# Patient Record
Sex: FEMALE | Race: WHITE | NOT HISPANIC OR LATINO | Employment: FULL TIME | URBAN - METROPOLITAN AREA
[De-identification: names, ages, dates, MRNs, and addresses within clinical notes are randomized per-mention and may not be internally consistent; named-entity substitution may affect disease eponyms.]

---

## 2017-07-14 ENCOUNTER — OFFICE VISIT (OUTPATIENT)
Dept: FAMILY MEDICINE CLINIC | Facility: CLINIC | Age: 32
End: 2017-07-14

## 2017-07-14 VITALS
RESPIRATION RATE: 16 BRPM | OXYGEN SATURATION: 99 % | SYSTOLIC BLOOD PRESSURE: 132 MMHG | WEIGHT: 156 LBS | TEMPERATURE: 99.7 F | HEIGHT: 69 IN | HEART RATE: 86 BPM | DIASTOLIC BLOOD PRESSURE: 80 MMHG | BODY MASS INDEX: 23.11 KG/M2

## 2017-07-14 DIAGNOSIS — F32.A DEPRESSION, UNSPECIFIED DEPRESSION TYPE: Primary | ICD-10-CM

## 2017-07-14 DIAGNOSIS — F41.9 ANXIETY: ICD-10-CM

## 2017-07-14 PROCEDURE — 99214 OFFICE O/P EST MOD 30 MIN: CPT | Performed by: NURSE PRACTITIONER

## 2017-07-14 RX ORDER — BUSPIRONE HYDROCHLORIDE 5 MG/1
5 TABLET ORAL 3 TIMES DAILY
Qty: 90 TABLET | Refills: 0 | Status: SHIPPED | OUTPATIENT
Start: 2017-07-14

## 2017-07-14 RX ORDER — ESCITALOPRAM OXALATE 20 MG/1
20 TABLET ORAL DAILY
Qty: 30 TABLET | Refills: 0 | Status: SHIPPED | OUTPATIENT
Start: 2017-07-14 | End: 2017-09-08

## 2017-07-14 NOTE — PROGRESS NOTES
Subjective   Samira Hodge is a 31 y.o. female.     History of Present Illness   Samira Hodge female 31 y.o. who presents today for new evaluation and treatment of Depression and Anxiety.  She reports depressed mood, crying spells, difficulty falling asleep, fatigue, anxiety, impaired concentration, depressed mood and anxiety, excessive worry, sleep disturbance, decressed appetite and weight loss. Onset of symptoms was approximately a few months ago.  She denies current suicidal and homicidal ideation. Risk factors are marriage difficulties and job stress.  Previous treatment includes none.  She complains of the following medication side effects: none.  The patient is currently in counseling.    The following portions of the patient's history were reviewed and updated as appropriate: allergies, current medications, past family history, past medical history, past social history, past surgical history and problem list.    Review of Systems   Constitutional: Negative for unexpected weight change.   Respiratory: Negative for shortness of breath.    Cardiovascular: Negative for chest pain and palpitations.   Psychiatric/Behavioral: Positive for decreased concentration, dysphoric mood and sleep disturbance. Negative for behavioral problems, self-injury and suicidal ideas.       Objective   Physical Exam   Constitutional: She is oriented to person, place, and time. She appears well-developed and well-nourished.   Pulmonary/Chest: Effort normal.   Neurological: She is alert and oriented to person, place, and time.   Psychiatric: Her speech is normal. Judgment and thought content normal. Cognition and memory are normal. She exhibits a depressed mood.   Nursing note and vitals reviewed.      Assessment/Plan   Samira was seen today for depression.    Diagnoses and all orders for this visit:    Depression, unspecified depression type  -     escitalopram (LEXAPRO) 20 MG tablet; Take 1 tablet by mouth Daily.  -      busPIRone (BUSPAR) 5 MG tablet; Take 1 tablet by mouth 3 (Three) Times a Day.    Anxiety  -     escitalopram (LEXAPRO) 20 MG tablet; Take 1 tablet by mouth Daily.  -     busPIRone (BUSPAR) 5 MG tablet; Take 1 tablet by mouth 3 (Three) Times a Day.        Patient denies current suicidal ideation. She states she would never do that as she has her 4 year old son.  Patient made a pact to contact our office, therapist, help line or go to ED if suicidal thoughts occur.

## 2017-08-11 ENCOUNTER — OFFICE VISIT (OUTPATIENT)
Dept: FAMILY MEDICINE CLINIC | Facility: CLINIC | Age: 32
End: 2017-08-11

## 2017-08-11 VITALS
HEART RATE: 72 BPM | HEIGHT: 69 IN | TEMPERATURE: 98.4 F | BODY MASS INDEX: 22.81 KG/M2 | RESPIRATION RATE: 16 BRPM | SYSTOLIC BLOOD PRESSURE: 112 MMHG | OXYGEN SATURATION: 99 % | DIASTOLIC BLOOD PRESSURE: 72 MMHG | WEIGHT: 154 LBS

## 2017-08-11 DIAGNOSIS — F32.A DEPRESSION, UNSPECIFIED DEPRESSION TYPE: Primary | ICD-10-CM

## 2017-08-11 PROCEDURE — 99213 OFFICE O/P EST LOW 20 MIN: CPT | Performed by: NURSE PRACTITIONER

## 2017-08-11 RX ORDER — DESVENLAFAXINE SUCCINATE 50 MG/1
50 TABLET, EXTENDED RELEASE ORAL DAILY
Qty: 30 TABLET | Refills: 0 | Status: SHIPPED | OUTPATIENT
Start: 2017-08-11 | End: 2017-09-08 | Stop reason: SDUPTHER

## 2017-08-11 NOTE — PROGRESS NOTES
Subjective   Samira Hodge is a 31 y.o. female.     History of Present Illness   F/u on depression and anxiety.  Does not feel like lexapro has helped. Patient has been taking for about 4 weeks now. She denies side effects.  She would like to try different medication.  She continues to follow up with her therapist regularly. She has been off work for a few weeks and states she does not feel like she can handle going back to work just yet.    The following portions of the patient's history were reviewed and updated as appropriate: allergies, current medications, past family history, past medical history, past social history, past surgical history and problem list.    Review of Systems   Constitutional: Negative for unexpected weight change.   Respiratory: Negative for shortness of breath.    Cardiovascular: Negative for chest pain and palpitations.   Psychiatric/Behavioral: Positive for decreased concentration, dysphoric mood and sleep disturbance. Negative for behavioral problems, self-injury and suicidal ideas.       Objective   Physical Exam   Constitutional: She is oriented to person, place, and time. She appears well-developed and well-nourished.   Pulmonary/Chest: Effort normal.   Neurological: She is alert and oriented to person, place, and time.   Psychiatric: She has a normal mood and affect. Her behavior is normal. Judgment and thought content normal.   Nursing note and vitals reviewed.      Assessment/Plan   Samira was seen today for depression and fatigue.    Diagnoses and all orders for this visit:    Depression, unspecified depression type  -     desvenlafaxine (PRISTIQ) 50 MG 24 hr tablet; Take 1 tablet by mouth Daily.

## 2017-09-08 ENCOUNTER — OFFICE VISIT (OUTPATIENT)
Dept: FAMILY MEDICINE CLINIC | Facility: CLINIC | Age: 32
End: 2017-09-08

## 2017-09-08 VITALS
RESPIRATION RATE: 16 BRPM | HEART RATE: 80 BPM | DIASTOLIC BLOOD PRESSURE: 72 MMHG | HEIGHT: 69 IN | BODY MASS INDEX: 23.7 KG/M2 | SYSTOLIC BLOOD PRESSURE: 110 MMHG | TEMPERATURE: 98.8 F | WEIGHT: 160 LBS

## 2017-09-08 DIAGNOSIS — F41.9 ANXIETY: ICD-10-CM

## 2017-09-08 DIAGNOSIS — F32.A DEPRESSION, UNSPECIFIED DEPRESSION TYPE: ICD-10-CM

## 2017-09-08 PROCEDURE — 99213 OFFICE O/P EST LOW 20 MIN: CPT | Performed by: NURSE PRACTITIONER

## 2017-09-08 RX ORDER — DESVENLAFAXINE 100 MG/1
100 TABLET, EXTENDED RELEASE ORAL DAILY
Qty: 30 TABLET | Refills: 5 | Status: SHIPPED | OUTPATIENT
Start: 2017-09-08 | End: 2018-03-26

## 2017-09-08 NOTE — PROGRESS NOTES
Subjective   Samira Hodge is a 32 y.o. female.     History of Present Illness   Samira Hodge female 32 y.o. who presents today for follow up of Depression and anxiety.  She reports medication has helped and needs to increase dose.  She denies current suicidal and homicidal ideation. Risk factors are marriage difficulties and job stress.  She complains of the following medication side effects: sexual dysfunction.  The patient is currently in counseling. Reports she quit her job which helped her stress. She is in marriage counseling with her  and reports improvement in relationship.     The following portions of the patient's history were reviewed and updated as appropriate: allergies, current medications, past family history, past medical history, past social history, past surgical history and problem list.    Review of Systems   Constitutional: Negative for unexpected weight change.   Respiratory: Negative for shortness of breath.    Cardiovascular: Negative for chest pain and palpitations.   Psychiatric/Behavioral: Negative for behavioral problems, dysphoric mood, self-injury and suicidal ideas. The patient is nervous/anxious.        Objective   Physical Exam   Constitutional: She is oriented to person, place, and time. She appears well-developed and well-nourished.   Pulmonary/Chest: Effort normal.   Neurological: She is alert and oriented to person, place, and time.   Psychiatric: She has a normal mood and affect. Her behavior is normal. Judgment and thought content normal.   Nursing note and vitals reviewed.      Assessment/Plan   Samira was seen today for depression.    Diagnoses and all orders for this visit:    Depression, unspecified depression type  -     desvenlafaxine (PRISTIQ) 100 MG 24 hr tablet; Take 1 tablet by mouth Daily.    Anxiety

## 2018-03-26 ENCOUNTER — OFFICE VISIT (OUTPATIENT)
Dept: FAMILY MEDICINE CLINIC | Facility: CLINIC | Age: 33
End: 2018-03-26

## 2018-03-26 VITALS
HEIGHT: 69 IN | OXYGEN SATURATION: 99 % | WEIGHT: 206 LBS | BODY MASS INDEX: 30.51 KG/M2 | TEMPERATURE: 98.4 F | DIASTOLIC BLOOD PRESSURE: 86 MMHG | RESPIRATION RATE: 18 BRPM | HEART RATE: 96 BPM | SYSTOLIC BLOOD PRESSURE: 121 MMHG

## 2018-03-26 DIAGNOSIS — F41.9 ANXIETY: Primary | ICD-10-CM

## 2018-03-26 PROCEDURE — 99213 OFFICE O/P EST LOW 20 MIN: CPT | Performed by: NURSE PRACTITIONER

## 2018-03-26 RX ORDER — DESVENLAFAXINE 25 MG/1
50 TABLET, EXTENDED RELEASE ORAL DAILY
Qty: 60 TABLET | Refills: 0 | Status: SHIPPED | OUTPATIENT
Start: 2018-03-26

## 2018-03-26 NOTE — PROGRESS NOTES
Subjective   Samira Hodge is a 32 y.o. female.     History of Present Illness   Samira Hodge female 32 y.o. who presents today for follow up of Anxiety.  She reports anxiety and weight gain. Onset of symptoms was approximately several months ago.  She denies current suicidal and homicidal ideation. Risk factors are marriage difficulties and job stress which has improved when she quit her job.   Previous treatment includes current Rx.  She complains of the following medication side effects: weight gain.   She would like to taper off pristiq as she does not feel she needs it anymore.     The following portions of the patient's history were reviewed and updated as appropriate: allergies, current medications, past family history, past medical history, past social history, past surgical history and problem list.    Review of Systems   Constitutional: Negative for unexpected weight change.   Respiratory: Negative for shortness of breath.    Cardiovascular: Negative for chest pain and palpitations.   Psychiatric/Behavioral: Negative for behavioral problems, self-injury, sleep disturbance and suicidal ideas. The patient is not nervous/anxious.        Objective   Physical Exam   Constitutional: She is oriented to person, place, and time. She appears well-developed and well-nourished.   Cardiovascular: Normal rate and regular rhythm.    Pulmonary/Chest: Effort normal and breath sounds normal.   Neurological: She is alert and oriented to person, place, and time.   Psychiatric: She has a normal mood and affect. Her behavior is normal. Judgment and thought content normal.   Nursing note and vitals reviewed.      Assessment/Plan   Samira was seen today for depression and weight gain.    Diagnoses and all orders for this visit:    Anxiety  -     Desvenlafaxine Succinate ER (PRISTIQ) 25 MG tablet sustained-release 24 hour; Take 2 tablets by mouth Daily. For 2 weeks, then decrease to 25 mg daily for 2 weeks then  stop

## 2023-07-30 ENCOUNTER — E-VISIT (OUTPATIENT)
Dept: FAMILY MEDICINE CLINIC | Facility: TELEHEALTH | Age: 38
End: 2023-07-30
Payer: COMMERCIAL

## 2023-07-30 PROCEDURE — BRIGHTMDVISIT: Performed by: NURSE PRACTITIONER

## 2023-07-30 NOTE — EXTERNAL PATIENT INSTRUCTIONS
Note   If symptoms do not improve in 3-5 days follow up at urgent care for evaluation   Diagnosis   Bacterial vaginosis   My name is MARILOU Perez, and I'm a healthcare provider at [facility]. Based on your interview, I see you have bacterial vaginosis (BV). BV is a very common vaginal infection. BV isn't considered a sexually transmitted infection (STI).   Medications   Your pharmacy   [id number] [facility] [place] [id number] [phone]     Prescription   Metronidazole (500mg): Take 1 tablet by mouth twice a day for 7 days for infection. Do not drink alcohol while taking this medication and for 24 hours after you finish the course of this medication. This medication is an antibiotic. Take it exactly as directed. You must finish the entire course of medication, even if you feel better after the first several days.   Fluconazole (150mg): Take 1 tablet by mouth once for 1 day as a single dose. Use this medication only if you develop a yeast infection. If symptoms are still present after 3 days, you may take a second tablet.    Metronidazole is the treatment recommended by the Centers for Disease Control and Prevention (CDC) for BV.    Because you've developed yeast infections after taking antibiotics in the past, I've prescribed Diflucan (fluconazole). Diflucan is an oral antifungal that treats yeast infections. If you develop yeast infection symptoms (such as itching or a white discharge) while taking the antibiotic or after finishing the antibiotic, then take the Diflucan as directed.   About your diagnosis   Bacterial vaginosis (BV) is a vaginal infection that happens when there are changes in the number and type of bacteria that normally live in the vagina. Overgrowth of certain bacteria can lead to symptoms commonly associated with BV. We're not sure what causes this overgrowth of bacteria, but we do have effective treatments to cure the infection.  Although BV isn't considered a sexually transmitted infection (STI), sexual activity is a risk factor for getting BV.   What to expect   If you follow this treatment plan, you should feel better in about 1 week. It's important that you finish all your medications, even if you feel better after the first several days.   When to seek care   Call us at [phone]   with any sudden or unexpected symptoms.    Symptoms that change or get worse.    Symptoms that don't go away, even after completing your treatment plan.    A fever of 101F or higher.    Frothy or foamy vaginal discharge.    Green or yellow vaginal discharge.    Spotting or bleeding unrelated to your period.    Severe abdominal cramping or pain.    Sores on your genital area.    Nausea or vomiting.   Other treatment   Avoid sex or use condoms consistently and correctly while you're being treated for BV.   BV can increase your risk of getting sexually transmitted infections (STIs), such as HIV, gonorrhea, chlamydia, and herpes. Talk to your healthcare provider about screening for these and other STIs.   Prevention    Use unscented soaps.    Make sure you rinse off all soap or shower gel when bathing or showering.    If you take baths, don't add soap or any other bath products to the bath water.    After bathing, dry off completely before you get dressed.    Don't use scented sanitary pads or tampons.    Avoid using scented condoms or dental dams.    Urinate after sex.    Drink plenty of water.    Avoid douching products. Douching can increase the risk of vaginal infections.    Avoid products that contain nonoxynol-9.   Your provider   Your diagnosis was provided by MARILOU Perez, a member of your trusted care team at [facility].   If you have any questions, call us at [phone]  .

## 2023-07-30 NOTE — E-VISIT TREATED
Chief Complaint: Yeast infection   Patient introduction   Patient is 37-year-old female presenting with 1 to 3 days of fishy-smelling vaginal discharge.   General presentation   No vaginal dryness, vaginal bleeding or spotting unrelated to menstruation, genital erythema, genital edema, darkening of genital skin, or vaginal pain.   Symptom onset was after menses.   Sexually active in the past 90 days. No new sex partner in previous 2 weeks. No treatment for an STI within the past 3 months.   No hormonal medication. Has not recently used douching products or products containing nonoxynol-9.   No new or recent use of possible irritants. Has not taken antibiotics in the last 2 weeks.   Positive history of vulvovaginal candidiasis, with 1 to 3 episodes in the previous 12 months. Current symptoms are different from previous episodes of vulvovaginal candidiasis.   Has not tried any treatments for current symptoms.   History of bacterial vaginosis, with 1 to 3 episodes in the previous 12 months. Current symptoms are similar to previous episodes of bacterial vaginosis.   The following treatments were helpful for bacterial vaginosis symptoms in the past:    Oral metronidazole   Review of red flags/alarm symptoms:    No genital trauma    No genital sores    No abdominal or pelvic surgery within the last month    No fever    No vomiting    No abdominal pain    No retained foreign object in vagina    No treatment for an STI in the last 3 months    No sexual partner tested positive for an STI   Pregnancy/menstrual status/breastfeeding: Not pregnant. Not breastfeeding. Regarding date of last menstrual period, patient writes: Last day of last cycle was July 25th.   Preferred medication route:   Prefers oral treatment option.   Previous antibiotic-induced yeast infection, so Diflucan (fluconazole) is recommended.   Current medications   Not taking other medications or supplements.   Medication allergies   None.   Medication  contraindication review   None.   Past medical history   No diabetes mellitus.   Immune conditions: No immunocompromising conditions.   No history of cancer.   Social history   Never smoked tobacco.   Patient-submitted comments   Patient was asked if they had anything to add about their symptoms. Patient writes: In the past when being treated for BV via oral antibiotics it has thrown me into a yeast infection after. Can we prescribed me something to follow the BV treatment today as well for the yeast infection I know will follow?.   Patient did not request an excuse note.   Assessment   Bacterial vaginosis.   This is the likely diagnosis based on patient's interview responses, including:    Symptom profile    Sexual activity in the past 90 days    Prior diagnosis of bacterial vaginosis with similar symptoms    Symptom onset after menses   Plan   Medications:    metronidazole 500 mg tablet RX 500mg 1 tab PO bid 7d for infection. Do not drink alcohol while taking this medication and for 24 hours after you finish the course of this medication. This medication is an antibiotic. Take it exactly as directed. You must finish the entire course of medication, even if you feel better after the first several days. Amount is 14 tab.    fluconazole 150 mg tablet RX 150mg 1 tab PO once 1d as a single dose for vaginal yeast infection. Repeat in 72 hours if symptoms persist. Amount is 2 tab.   The patient's prescriptions will be sent to:   [id number]   [id number] [place] [id number]   Phone: [phone]     Fax: [phone]   Education:    Condition and causes    Prevention    Treatment and self-care    When to call provider   Follow-up:   Patient to follow up as needed for progression or lack of improvement in symptoms within 7 to 10 days.   ----------   Electronically signed by MARILOU Perez on 2023-07-30 at 12:09PM   ----------   Patient Interview Transcript:   Which of these  symptoms are bothering you? Select all that apply.    Fishy-smelling vaginal discharge   Not selected:    Itching around my vagina    Itching in my vagina    Burning around my vagina    Burning in my vagina    Vaginal discharge that looks different than usual    Pain during sex    Burning with urination    None of the above   How long have you had these symptoms? Select one.    1 to 3 days   Not selected:    Less than 24 hours    4 to 7 days    More than 7 days   Do you have any of these vaginal symptoms? Select all that apply.    None of the above   Not selected:    Pain    Dryness    Redness    Swelling    Darkening of the skin of the vulva    Unexpected bleeding or spotting between periods or after menopause   Since your symptoms started, have you used a vaginal health screening kit to check the acidity (pH) of your vaginal discharge? These kits are available without a prescription at many drug stores and pharmacies. Common brands include Monistat Complete Care Vaginal Health Test and Vagisil Screening Kit. Select one.    No   Not selected:    Yes   Have you noticed any sores on your genital area? This includes blisters or ulcers. Select one.    No   Not selected:    Yes   Along with your vaginal symptoms, have you had any of these symptoms? Select all that apply.    None of the above   Not selected:    Fever    Vomiting    Abdominal (stomach) pain   Before your symptoms began, did you have an injury to your genital area or vagina? Select one.    No   Not selected:    Yes   Could an object like a tampon or condom be stuck inside your vagina? Select one.    No   Not selected:    Yes   In the 2 weeks before your symptoms began, did you use either of these products? Select all that apply.    None of the above   Not selected:    Douching products    Products containing nonoxynol-9, a spermicide found in condoms, sponges, vaginal films, and jellies   In the week before your symptoms started, did any of these come into  contact with your genital area? Select all that apply.    None of the above   Not selected:    New soap    Underwear washed with a new laundry detergent    New sex toy    New cream or lotion    New medication    New perfume    New feminine or flushable wipe    Other new product (specify)   Have you had a yeast infection before? Select one.    Yes, but my current symptoms feel different than before   Not selected:    Yes, and my current symptoms feel the same as before    No   How many yeast infections have you had in the last 12 months? Select one.    1 to 3   Not selected:    0    4 or more   Have you ever been treated for bacterial vaginosis (BV)? Select one.    Yes   Not selected:    No   Compared to the last time you were treated for BV, how do your current symptoms feel? Select one.    The same   Not selected:    Different   In the last 12 months, how many times have you been treated for BV? Select one.    1 to 3   Not selected:    0    4 or more   In the past, have you developed yeast infections as a result of taking oral antibiotics? Select one.    Yes   Not selected:    No   Were you sexually active at any time in the last 3 months? Select one.    Yes   Not selected:    No   Have you had sex with a new partner in the last 2 weeks? Select one.    No   Not selected:    Yes   Have you had sex with 3 or more partners in the last 3 months? Select one.    No   Not selected:    Yes   In the last 3 months, were you treated for a sexually transmitted infection (STI)? Examples of STIs include chlamydia, gonorrhea, trichomoniasis (trich), herpes, or syphilis. Select one.    No   Not selected:    Yes   Has your sexual partner(s) recently tested positive for a sexually transmitted infection (STI)? Select all that apply.    No, not that I know of   Not selected:    Yes, chlamydia    Yes, gonorrhea    Yes, trichomoniasis (trich)    Other (specify)   Are you pregnant? Select one.    No   Not selected:    Yes   When was your  "last menstrual period? If you don't currently have periods or no longer have periods, please briefly explain.    Last day of last cycle was July 25th   Are you breastfeeding? Select one.    No   Not selected:    Yes   Did your symptoms start before, during, or after your menstrual period? Select one.    After   Not selected:    Before    During    I don't currently have periods or no longer have periods    I'm not sure   Have you recently had abdominal or pelvic surgery? Select one.    No   Not selected:    Yes, within the last month    Yes, within the last 3 months   Are you currently being treated for Type 1 or Type 2 diabetes? Select one.    No   Not selected:    Yes   Do you have any of these conditions that can affect the immune system? Scroll to see all options. Select all that apply.    None of these   Not selected:    History of bone marrow transplant    Chronic kidney disease    Chronic liver disease (including cirrhosis)    HIV/AIDS    Inflammatory bowel disease (Crohn's disease or ulcerative colitis)    Lupus    Moderate to severe plaque psoriasis    Multiple sclerosis    Rheumatoid arthritis    Sickle cell anemia    Alpha or beta thalassemia    History of solid organ transplant (kidney, liver, or heart)    History of spleen removal    An autoimmune disorder not listed here (specify)    A condition requiring treatment with long-term use of oral steroids (such as prednisone, prednisolone, or dexamethasone) (specify)   Have you ever been diagnosed with cancer? Select one.    No   Not selected:    Yes, I have cancer now    Yes, but I'm in remission   Have you been treated for antibiotic-associated colitis in the last month? This is also called \"C. diff colitis.\" It's commonly caused by the bacteria Clostridium difficile. Select one.    No   Not selected:    Yes   Have you ever been diagnosed with the heart condition known as prolonged QT interval or QT prolongation? Select one.    No   Not selected:    Yes   " Do you smoke tobacco? Select one.    No, never   Not selected:    Yes, every day    Yes, some days    No, I quit   Have you tried any of these over-the-counter treatments for your current symptoms? Select all that apply.    I haven't tried anything for my symptoms   Not selected:    Vaginal cream, ointment, or suppository for yeast infection    Anti-itch cream or ointment containing hydrocortisone    Vaginal wash    Vaginal wipes, such as Summer's Marybeth    Homeopathic treatment    Other (specify)   Do you take or use any of these medications containing estrogen or progesterone? Select one.    None of the above   Not selected:    Birth control pills    Hormonal intrauterine device (IUD)    Contraceptive patch    Vaginal ring, such as NuvaRing    Birth control shot, such as Depo-Provera    Hormone replacement therapy (HRT), such as oral and topical medication, vaginal ring, and transdermal patch   When you had BV before, did you use medication to treat it? Select one.    Yes   Not selected:    No   Metronidazole oral tablets (Flagyl)    Helpful   Not selected:    Not helpful   In the last 2 weeks, have you taken oral antibiotics? Oral antibiotics are medications that you take by mouth to treat a bacterial infection. Select one.    No   Not selected:    Yes   Are you taking any other medications, vitamins, or supplements? Select one.    No   Not selected:    Yes   Have you ever had an allergic or bad reaction to any medication? Select one.    No   Not selected:    Yes   Have you used the medication disulfiram (Antabuse) in the last 2 weeks? Select one.    No   Not selected:    Yes   If medication is needed, would you prefer oral or vaginal medication? - Oral medications are pills that you swallow. - Vaginal medications are gels, creams, ointments, or suppositories that are placed in the vagina. We'll try to provide medication in the form you prefer, but that's not always possible. Select one.    Oral   Not selected:     Vaginal    No preference   Do you need a doctor's note? A doctor's note confirms that you received care today and states when you can return to school or work. It does not contain information about your diagnosis or treatment plan. Your provider will make the final decision on whether to give you a doctor's note. Doctor's notes CANNOT be backdated. Select one.    No   Not selected:    Today only (1 day)    Today and tomorrow (2 days)    3 days   Is there anything you'd like to add about your symptoms? Please limit your comments to the symptoms asked about in this interview. If you include comments about other concerns, your provider may recommend that you be seen in person.    In the past when being treated for BV via oral antibiotics it has thrown me into a yeast infection after. Can we prescribed me something to follow the BV treatment today as well for the yeast infection I know will follow?   ----------   Medical history   Medical history data does not currently exist for this patient.

## 2023-09-07 ENCOUNTER — E-VISIT (OUTPATIENT)
Dept: FAMILY MEDICINE CLINIC | Facility: TELEHEALTH | Age: 38
End: 2023-09-07
Payer: COMMERCIAL

## 2023-09-07 NOTE — E-VISIT ESCALATED
Patient escalated   Provider MARILOU Gillespie chose to escalate patient to another level of care because: Patient needs a lab test   Patient was sent the following message:   You need a vaginal swab for diagnosis.   What to do now:   Urgent Care   You should be seen within the next 24 hours.   Please go to a walk-in clinic or urgent care, or make an appointment to be seen within the next 24 hours.   You won't be charged for your eVisit. If you paid with a credit card, the charge will be reversed.   Chief Complaint: Yeast infection   Patient introduction   Patient is 38-year-old female presenting with 1 to 3 days of fishy-smelling vaginal discharge.   General presentation   No vaginal dryness, vaginal bleeding or spotting unrelated to menstruation, genital erythema, genital edema, darkening of genital skin, or vaginal pain.   Symptom onset was after menses.   Sexually active in the past 90 days. No new sex partner in previous 2 weeks. No treatment for an STI within the past 3 months.   No hormonal medication. Has not recently used douching products or products containing nonoxynol-9.   No new or recent use of possible irritants. Has not taken antibiotics in the last 2 weeks.   Positive history of vulvovaginal candidiasis, with 1 to 3 episodes in the previous 12 months. Current symptoms are similar to previous episodes of vulvovaginal candidiasis.   Has not tried any treatments for current symptoms.   History of bacterial vaginosis, with 1 to 3 episodes in the previous 12 months. Current symptoms are similar to previous episodes of bacterial vaginosis.   The following treatments were not helpful for bacterial vaginosis symptoms in the past:    Oral clindamycin    Topical clindamycin    Clindamycin vaginal ovule    Oral metronidazole    Topical metronidazole    Oral tinidazole   Review of red flags/alarm symptoms:    No genital trauma    No genital sores    No abdominal or pelvic surgery within the last month     No fever    No vomiting    No abdominal pain    No retained foreign object in vagina    No treatment for an STI in the last 3 months    No sexual partner tested positive for an STI   Pregnancy/menstrual status/breastfeeding: Not pregnant. Not breastfeeding. Regarding date of last menstrual period, patient writes: August 17th.   Preferred medication route:   Prefers oral treatment option.   Previous antibiotic-induced yeast infection, so Diflucan (fluconazole) is recommended.   Current medications   Not taking other medications or supplements.   Medication allergies   None.   Medication contraindication review   None.   Past medical history   No diabetes mellitus.   Immune conditions: No immunocompromising conditions.   No history of cancer.   Social history   Never smoked tobacco.   Patient-submitted comments   Patient was asked if they had anything to add about their symptoms. Patient writes: Metronidazole did not feel like it throughly worked a few weeks back. In the past I had Bactrim. Also the week long didn't feel like it got rid of it. It was like I needed a few more days of it or another week. Also I tend to get yeast infection after.   Patient did not request an excuse note.   Assessment:   Patient determined to need a level of care not appropriate to be delivered through eVisit.   Plan:   Patient informed of need to seek in-person care   ----------   Electronically signed by MARILOU Gillespie on 2023-09-07 at 18:48PM   ----------   Patient Interview Transcript:   Which of these symptoms are bothering you? Select all that apply.    Fishy-smelling vaginal discharge   Not selected:    Itching around my vagina    Itching in my vagina    Burning around my vagina    Burning in my vagina    Vaginal discharge that looks different than usual    Pain during sex    Burning with urination    None of the above   How long have you had these symptoms? Select one.    1 to 3 days   Not selected:    Less than 24 hours     4 to 7 days    More than 7 days   Do you have any of these vaginal symptoms? Select all that apply.    None of the above   Not selected:    Pain    Dryness    Redness    Swelling    Darkening of the skin of the vulva    Unexpected bleeding or spotting between periods or after menopause   Since your symptoms started, have you used a vaginal health screening kit to check the acidity (pH) of your vaginal discharge? These kits are available without a prescription at many drug stores and pharmacies. Common brands include Monistat Complete Care Vaginal Health Test and Vagisil Screening Kit. Select one.    No   Not selected:    Yes   Have you noticed any sores on your genital area? This includes blisters or ulcers. Select one.    No   Not selected:    Yes   Along with your vaginal symptoms, have you had any of these symptoms? Select all that apply.    None of the above   Not selected:    Fever    Vomiting    Abdominal (stomach) pain   Before your symptoms began, did you have an injury to your genital area or vagina? Select one.    No   Not selected:    Yes   Could an object like a tampon or condom be stuck inside your vagina? Select one.    No   Not selected:    Yes   In the 2 weeks before your symptoms began, did you use either of these products? Select all that apply.    None of the above   Not selected:    Douching products    Products containing nonoxynol-9, a spermicide found in condoms, sponges, vaginal films, and jellies   In the week before your symptoms started, did any of these come into contact with your genital area? Select all that apply.    None of the above   Not selected:    New soap    Underwear washed with a new laundry detergent    New sex toy    New cream or lotion    New medication    New perfume    New feminine or flushable wipe    Other new product (specify)   Have you had a yeast infection before? Select one.    Yes, and my current symptoms feel the same as before   Not selected:    Yes, but my  current symptoms feel different than before    No   How many yeast infections have you had in the last 12 months? Select one.    1 to 3   Not selected:    0    4 or more   Have you ever been treated for bacterial vaginosis (BV)? Select one.    Yes   Not selected:    No   Compared to the last time you were treated for BV, how do your current symptoms feel? Select one.    The same   Not selected:    Different   In the last 12 months, how many times have you been treated for BV? Select one.    1 to 3   Not selected:    0    4 or more   In the past, have you developed yeast infections as a result of taking oral antibiotics? Select one.    Yes   Not selected:    No   Were you sexually active at any time in the last 3 months? Select one.    Yes   Not selected:    No   Have you had sex with a new partner in the last 2 weeks? Select one.    No   Not selected:    Yes   Have you had sex with 3 or more partners in the last 3 months? Select one.    No   Not selected:    Yes   In the last 3 months, were you treated for a sexually transmitted infection (STI)? Examples of STIs include chlamydia, gonorrhea, trichomoniasis (trich), herpes, or syphilis. Select one.    No   Not selected:    Yes   Has your sexual partner(s) recently tested positive for a sexually transmitted infection (STI)? Select all that apply.    No, not that I know of   Not selected:    Yes, chlamydia    Yes, gonorrhea    Yes, trichomoniasis (trich)    Other (specify)   Are you pregnant? Select one.    No   Not selected:    Yes   When was your last menstrual period? If you don't currently have periods or no longer have periods, please briefly explain.    August 17th   Are you breastfeeding? Select one.    No   Not selected:    Yes   Did your symptoms start before, during, or after your menstrual period? Select one.    After   Not selected:    Before    During    I don't currently have periods or no longer have periods    I'm not sure   Have you recently had  "abdominal or pelvic surgery? Select one.    No   Not selected:    Yes, within the last month    Yes, within the last 3 months   Are you currently being treated for Type 1 or Type 2 diabetes? Select one.    No   Not selected:    Yes   Do you have any of these conditions that can affect the immune system? Scroll to see all options. Select all that apply.    None of these   Not selected:    History of bone marrow transplant    Chronic kidney disease    Chronic liver disease (including cirrhosis)    HIV/AIDS    Inflammatory bowel disease (Crohn's disease or ulcerative colitis)    Lupus    Moderate to severe plaque psoriasis    Multiple sclerosis    Rheumatoid arthritis    Sickle cell anemia    Alpha or beta thalassemia    History of solid organ transplant (kidney, liver, or heart)    History of spleen removal    An autoimmune disorder not listed here (specify)    A condition requiring treatment with long-term use of oral steroids (such as prednisone, prednisolone, or dexamethasone) (specify)   Have you ever been diagnosed with cancer? Select one.    No   Not selected:    Yes, I have cancer now    Yes, but I'm in remission   Have you been treated for antibiotic-associated colitis in the last month? This is also called \"C. diff colitis.\" It's commonly caused by the bacteria Clostridium difficile. Select one.    No   Not selected:    Yes   Have you ever been diagnosed with the heart condition known as prolonged QT interval or QT prolongation? Select one.    No   Not selected:    Yes   Do you smoke tobacco? Select one.    No, never   Not selected:    Yes, every day    Yes, some days    No, I quit   Have you tried any of these over-the-counter treatments for your current symptoms? Select all that apply.    I haven't tried anything for my symptoms   Not selected:    Vaginal cream, ointment, or suppository for yeast infection    Anti-itch cream or ointment containing hydrocortisone    Vaginal wash    Vaginal wipes, such as " Summer's Marybeth    Homeopathic treatment    Other (specify)   Do you take or use any of these medications containing estrogen or progesterone? Select one.    None of the above   Not selected:    Birth control pills    Hormonal intrauterine device (IUD)    Contraceptive patch    Vaginal ring, such as NuvaRing    Birth control shot, such as Depo-Provera    Hormone replacement therapy (HRT), such as oral and topical medication, vaginal ring, and transdermal patch   When you had BV before, did you use medication to treat it? Select one.    Yes   Not selected:    No   Clindamycin oral capsules (Cleocin)    Not helpful   Not selected:    Helpful   Clindamycin vaginal cream (Cleocin Vaginal, ClindaMax Vaginal, Clindesse)    Not helpful   Not selected:    Helpful   Clindamycin vaginal ovule (Cleocin Vaginal)    Not helpful   Not selected:    Helpful   Metronidazole oral tablets (Flagyl)    Not helpful   Not selected:    Helpful   Metronidazole vaginal gel (MetroGel Vaginal, Nuvessa, Vandazole)    Not helpful   Not selected:    Helpful   Tinidazole oral tablets (Tindamax)    Not helpful   Not selected:    Helpful   In the last 2 weeks, have you taken oral antibiotics? Oral antibiotics are medications that you take by mouth to treat a bacterial infection. Select one.    No   Not selected:    Yes   Are you taking any other medications, vitamins, or supplements? Select one.    No   Not selected:    Yes   Have you ever had an allergic or bad reaction to any medication? Select one.    No   Not selected:    Yes   Have you used the medication disulfiram (Antabuse) in the last 2 weeks? Select one.    No   Not selected:    Yes   If medication is needed, would you prefer oral or vaginal medication? - Oral medications are pills that you swallow. - Vaginal medications are gels, creams, ointments, or suppositories that are placed in the vagina. We'll try to provide medication in the form you prefer, but that's not always possible. Select  one.    Oral   Not selected:    Vaginal    No preference   Do you need a doctor's note? A doctor's note confirms that you received care today and states when you can return to school or work. It does not contain information about your diagnosis or treatment plan. Your provider will make the final decision on whether to give you a doctor's note. Doctor's notes CANNOT be backdated. Select one.    No   Not selected:    Today only (1 day)    Today and tomorrow (2 days)    3 days   Is there anything you'd like to add about your symptoms? Please limit your comments to the symptoms asked about in this interview. If you include comments about other concerns, your provider may recommend that you be seen in person.    Metronidazole did not feel like it throughly worked a few weeks back. In the past I had Bactrim. Also the week long didn't feel like it got rid of it. It was like I needed a few more days of it or another week. Also I tend to get yeast infection after   ----------   Medical history   Medical history data does not currently exist for this patient.

## 2024-12-17 ENCOUNTER — OFFICE VISIT (OUTPATIENT)
Dept: FAMILY MEDICINE CLINIC | Facility: CLINIC | Age: 39
End: 2024-12-17
Payer: COMMERCIAL

## 2024-12-17 VITALS
HEART RATE: 82 BPM | HEIGHT: 69 IN | SYSTOLIC BLOOD PRESSURE: 118 MMHG | TEMPERATURE: 98.5 F | BODY MASS INDEX: 24.38 KG/M2 | WEIGHT: 164.6 LBS | OXYGEN SATURATION: 98 % | DIASTOLIC BLOOD PRESSURE: 62 MMHG

## 2024-12-17 DIAGNOSIS — D68.51 FACTOR V LEIDEN: Primary | ICD-10-CM

## 2024-12-17 DIAGNOSIS — Z00.00 WELLNESS EXAMINATION: ICD-10-CM

## 2024-12-17 PROCEDURE — 99386 PREV VISIT NEW AGE 40-64: CPT | Performed by: NURSE PRACTITIONER

## 2024-12-17 NOTE — PROGRESS NOTES
Subjective   Samira Hodge is a 39 y.o. female.     History of Present Illness   Samira Hodge 39 y.o. female who presents today for a new patient appointment.    she has a history of   Patient Active Problem List   Diagnosis    Factor V Leiden   .  she is here for a wellness check up and is here to re-establish care I reviewed the PFSH recorded today by my MA/LPN staff.   she   She has been feeling well.Preventative counseling provided and diet/exercise and vaccinations.     She has an appointment coming up next week with dermatology for some acne issues that have developed more recently.  She is also noted some small papular lesions that have come up on her face and chest that have been there for several months to years and have not resolved.      The following portions of the patient's history were reviewed and updated as appropriate: allergies, current medications, past family history, past medical history, past social history, past surgical history, and problem list.    Review of Systems   Constitutional:  Negative for unexpected weight change.   Respiratory:  Negative for shortness of breath.    Cardiovascular:  Negative for chest pain and palpitations.   Psychiatric/Behavioral:  Negative for behavioral problems.        Objective   Physical Exam  Vitals and nursing note reviewed.   Constitutional:       Appearance: Normal appearance. She is well-developed.   Cardiovascular:      Rate and Rhythm: Normal rate and regular rhythm.   Pulmonary:      Effort: Pulmonary effort is normal.      Breath sounds: Normal breath sounds.   Skin:     Comments: Approximately 2 mm flesh-colored papular lesion with umbilicated center to left frontal area.   Neurological:      Mental Status: She is alert and oriented to person, place, and time.   Psychiatric:         Mood and Affect: Mood normal.         Behavior: Behavior normal.         Thought Content: Thought content normal.         Judgment: Judgment normal.          Assessment & Plan   Diagnoses and all orders for this visit:    1. Factor V Leiden (Primary)  -     Comprehensive metabolic panel  -     Lipid panel  -     CBC and Differential  -     TSH  -     Iron level  -     Ferritin  -     Vitamin D 25 hydroxy    2. Wellness examination  -     Comprehensive metabolic panel  -     Lipid panel  -     CBC and Differential  -     TSH  -     Iron level  -     Ferritin  -     Vitamin D 25 hydroxy        Discussed with her that papular lesion to left frontal area could be a basal cell carcinoma and to have dermatology look at it next week at her appointment.  Recommended getting overall skin check to have other places also looked at.

## 2024-12-18 ENCOUNTER — PATIENT ROUNDING (BHMG ONLY) (OUTPATIENT)
Dept: FAMILY MEDICINE CLINIC | Facility: CLINIC | Age: 39
End: 2024-12-18
Payer: COMMERCIAL

## 2024-12-18 NOTE — PROGRESS NOTES
A My-Chart message has been sent to the patient for PATIENT ROUNDING with [facility]

## 2025-01-18 LAB
25(OH)D3+25(OH)D2 SERPL-MCNC: 22.4 NG/ML (ref 30–100)
ALBUMIN SERPL-MCNC: 4 G/DL (ref 3.5–5.2)
ALBUMIN/GLOB SERPL: 1.4 G/DL
ALP SERPL-CCNC: 57 U/L (ref 39–117)
ALT SERPL-CCNC: 11 U/L (ref 1–33)
AST SERPL-CCNC: 12 U/L (ref 1–32)
BASOPHILS # BLD AUTO: 0.07 10*3/MM3 (ref 0–0.2)
BASOPHILS NFR BLD AUTO: 1.3 % (ref 0–1.5)
BILIRUB SERPL-MCNC: 0.5 MG/DL (ref 0–1.2)
BUN SERPL-MCNC: 14 MG/DL (ref 6–20)
BUN/CREAT SERPL: 17.3 (ref 7–25)
CALCIUM SERPL-MCNC: 9.9 MG/DL (ref 8.6–10.5)
CHLORIDE SERPL-SCNC: 104 MMOL/L (ref 98–107)
CHOLEST SERPL-MCNC: 182 MG/DL (ref 0–200)
CO2 SERPL-SCNC: 26.8 MMOL/L (ref 22–29)
CREAT SERPL-MCNC: 0.81 MG/DL (ref 0.57–1)
EGFRCR SERPLBLD CKD-EPI 2021: 94.2 ML/MIN/1.73
EOSINOPHIL # BLD AUTO: 0.2 10*3/MM3 (ref 0–0.4)
EOSINOPHIL NFR BLD AUTO: 3.7 % (ref 0.3–6.2)
ERYTHROCYTE [DISTWIDTH] IN BLOOD BY AUTOMATED COUNT: 11.7 % (ref 12.3–15.4)
FERRITIN SERPL-MCNC: 39.3 NG/ML (ref 13–150)
GLOBULIN SER CALC-MCNC: 2.8 GM/DL
GLUCOSE SERPL-MCNC: 94 MG/DL (ref 65–99)
HCT VFR BLD AUTO: 39 % (ref 34–46.6)
HDLC SERPL-MCNC: 62 MG/DL (ref 40–60)
HGB BLD-MCNC: 12.6 G/DL (ref 12–15.9)
IMM GRANULOCYTES # BLD AUTO: 0.02 10*3/MM3 (ref 0–0.05)
IMM GRANULOCYTES NFR BLD AUTO: 0.4 % (ref 0–0.5)
IRON SERPL-MCNC: 144 MCG/DL (ref 37–145)
LDLC SERPL CALC-MCNC: 109 MG/DL (ref 0–100)
LYMPHOCYTES # BLD AUTO: 1.79 10*3/MM3 (ref 0.7–3.1)
LYMPHOCYTES NFR BLD AUTO: 33.2 % (ref 19.6–45.3)
MCH RBC QN AUTO: 30.1 PG (ref 26.6–33)
MCHC RBC AUTO-ENTMCNC: 32.3 G/DL (ref 31.5–35.7)
MCV RBC AUTO: 93.3 FL (ref 79–97)
MONOCYTES # BLD AUTO: 0.36 10*3/MM3 (ref 0.1–0.9)
MONOCYTES NFR BLD AUTO: 6.7 % (ref 5–12)
NEUTROPHILS # BLD AUTO: 2.95 10*3/MM3 (ref 1.7–7)
NEUTROPHILS NFR BLD AUTO: 54.7 % (ref 42.7–76)
NRBC BLD AUTO-RTO: 0 /100 WBC (ref 0–0.2)
PLATELET # BLD AUTO: 279 10*3/MM3 (ref 140–450)
POTASSIUM SERPL-SCNC: 4.4 MMOL/L (ref 3.5–5.2)
PROT SERPL-MCNC: 6.8 G/DL (ref 6–8.5)
RBC # BLD AUTO: 4.18 10*6/MM3 (ref 3.77–5.28)
SODIUM SERPL-SCNC: 139 MMOL/L (ref 136–145)
TRIGL SERPL-MCNC: 56 MG/DL (ref 0–150)
TSH SERPL DL<=0.005 MIU/L-ACNC: 1.05 UIU/ML (ref 0.27–4.2)
VLDLC SERPL CALC-MCNC: 11 MG/DL (ref 5–40)
WBC # BLD AUTO: 5.39 10*3/MM3 (ref 3.4–10.8)

## 2025-04-22 ENCOUNTER — OFFICE VISIT (OUTPATIENT)
Dept: FAMILY MEDICINE CLINIC | Facility: CLINIC | Age: 40
End: 2025-04-22
Payer: COMMERCIAL

## 2025-04-22 VITALS
HEART RATE: 70 BPM | SYSTOLIC BLOOD PRESSURE: 130 MMHG | OXYGEN SATURATION: 100 % | DIASTOLIC BLOOD PRESSURE: 78 MMHG | BODY MASS INDEX: 25.3 KG/M2 | TEMPERATURE: 98.2 F | HEIGHT: 69 IN | WEIGHT: 170.8 LBS

## 2025-04-22 DIAGNOSIS — S39.012A STRAIN OF LUMBAR REGION, INITIAL ENCOUNTER: Primary | ICD-10-CM

## 2025-04-22 DIAGNOSIS — B35.1 ONYCHOMYCOSIS: ICD-10-CM

## 2025-04-22 RX ORDER — CYCLOBENZAPRINE HCL 5 MG
TABLET ORAL
Qty: 60 TABLET | Refills: 0 | Status: SHIPPED | OUTPATIENT
Start: 2025-04-22

## 2025-04-22 NOTE — PROGRESS NOTES
Subjective   Samira Hodge is a 39 y.o. female.     History of Present Illness   Chief Complaint     Toe Pain (- [place] on left foot  -Been going on for about 5 weeks)  Nail Problem- feels she may have nail fungus.   Back Pain (Pulled a muscle in her back  -Would like to see if she can get a muscle relaxer )- states it is in left low back and feels tight and spasms at times.  Does not radiate.      Patient reports a couple episodes of chest pain that occurred on 2 separate occasions.  1 was when she was driving and lasted for several minutes.  The other episode was also at rest but she felt radiated into her left armpit.  She denies any chest pain or shortness of breath with exertion.  She is still able to be active without any issue.  She is also noted a couple episodes of palpitations that were not associated with the chest pain.  She has not had palpitations previously.  She does not feel that they were related to anxiety.  She has not had any within the past week or so.    The following portions of the patient's history were reviewed and updated as appropriate: allergies, current medications, past family history, past medical history, past social history, past surgical history, and problem list.    Review of Systems   Constitutional:  Negative for chills, diaphoresis, fatigue and fever.   HENT:  Negative for congestion and sore throat.    Respiratory:  Negative for cough.    Gastrointestinal:  Negative for abdominal pain, nausea and vomiting.   Genitourinary:  Negative for dysuria.   Musculoskeletal:  Negative for myalgias and neck pain.   Skin:  Negative for rash.   Neurological:  Negative for weakness and headaches.       Objective   Physical Exam  Vitals and nursing note reviewed.   Constitutional:       Appearance: Normal appearance. She is well-developed.   Cardiovascular:      Rate and Rhythm: Normal rate and regular rhythm.   Pulmonary:      Effort: Pulmonary effort is normal.      Breath sounds:  Normal breath sounds.   Musculoskeletal:      Lumbar back: Tenderness present.        Back:    Feet:      Left foot:      Toenail Condition: Left toenails are abnormally thick. Fungal disease present.     Comments: There is also some mild swelling to the nailbed without erythema or drainage.  Patient does have tenderness to palpation of this area.  Neurological:      Mental Status: She is alert and oriented to person, place, and time.   Psychiatric:         Mood and Affect: Mood normal.         Behavior: Behavior normal.         Thought Content: Thought content normal.         Judgment: Judgment normal.         Assessment & Plan   Diagnoses and all orders for this visit:    1. Strain of lumbar region, initial encounter (Primary)  -     cyclobenzaprine (FLEXERIL) 5 MG tablet; TAKE 1 TO 2 TABLETS BY MOUTH TID PRN BACK SPASM  Dispense: 60 tablet; Refill: 0    2. Onychomycosis  -     Discontinue: Fluconazole 2 %, Terbinafine HCl 3 %, Ibuprofen 3 %, vitamin D 50,000 Units, Dimethyl Sulfoxide 50 %; Apply 15 mL topically to the appropriate area as directed 2 (Two) Times a Day.  Dispense: 15 mL; Refill: 2  -     Fluconazole 2 %, Terbinafine HCl 3 %, Ibuprofen 3 %, vitamin D 50,000 Units, Dimethyl Sulfoxide 50 %; Apply 15 mL topically to the appropriate area as directed 2 (Two) Times a Day.  Dispense: 15 mL; Refill: 2          She does not feel the need for cardiology referral at this time.  She does not have any family history of cardiovascular disease that she is aware of.  She has never smoked.  Discussed possibility of CT calcium score for screening.  She will let me know if she wants to proceed with this.  If palpitations or chest pain recur, will refer to cardiology.

## 2025-06-24 DIAGNOSIS — B35.1 ONYCHOMYCOSIS: Primary | ICD-10-CM
